# Patient Record
Sex: MALE | Race: BLACK OR AFRICAN AMERICAN | NOT HISPANIC OR LATINO | Employment: OTHER | URBAN - METROPOLITAN AREA
[De-identification: names, ages, dates, MRNs, and addresses within clinical notes are randomized per-mention and may not be internally consistent; named-entity substitution may affect disease eponyms.]

---

## 2017-03-07 PROBLEM — S76.112A RUPTURE OF LEFT QUADRICEPS TENDON: Status: ACTIVE | Noted: 2017-03-07

## 2017-04-04 PROBLEM — L03.90 CELLULITIS: Status: ACTIVE | Noted: 2017-04-04

## 2017-04-04 PROBLEM — L02.01 CELLULITIS AND ABSCESS OF FACE: Status: ACTIVE | Noted: 2017-04-04

## 2017-04-04 PROBLEM — L03.211 CELLULITIS AND ABSCESS OF FACE: Status: ACTIVE | Noted: 2017-04-04

## 2017-04-04 PROBLEM — I50.9 CONGESTIVE HEART FAILURE: Status: ACTIVE | Noted: 2017-04-04

## 2017-04-06 PROBLEM — G47.30 SLEEP APNEA: Chronic | Status: ACTIVE | Noted: 2017-04-06

## 2017-04-07 PROBLEM — T78.40XA ALLERGY: Status: ACTIVE | Noted: 2017-04-07

## 2017-04-12 PROBLEM — G47.30 SLEEP APNEA: Chronic | Status: RESOLVED | Noted: 2017-04-06 | Resolved: 2017-04-12

## 2017-04-12 PROBLEM — L03.211 CELLULITIS AND ABSCESS OF FACE: Status: RESOLVED | Noted: 2017-04-04 | Resolved: 2017-04-12

## 2017-04-12 PROBLEM — L02.01 CELLULITIS AND ABSCESS OF FACE: Status: RESOLVED | Noted: 2017-04-04 | Resolved: 2017-04-12

## 2017-04-12 PROBLEM — L03.90 CELLULITIS: Status: RESOLVED | Noted: 2017-04-04 | Resolved: 2017-04-12

## 2018-01-05 PROBLEM — I48.91 NEW ONSET ATRIAL FIBRILLATION: Status: ACTIVE | Noted: 2018-01-05

## 2018-01-06 PROBLEM — I47.10 SVT (SUPRAVENTRICULAR TACHYCARDIA): Status: ACTIVE | Noted: 2018-01-05

## 2018-12-11 PROBLEM — S42.91XA CLOSED FRACTURE OF RIGHT SHOULDER: Status: ACTIVE | Noted: 2018-12-11

## 2018-12-12 ENCOUNTER — TELEPHONE (OUTPATIENT)
Dept: ORTHOPEDICS | Facility: CLINIC | Age: 53
End: 2018-12-12

## 2018-12-12 PROBLEM — R52 PAIN: Status: ACTIVE | Noted: 2018-12-12

## 2019-04-17 PROBLEM — I26.99 PULMONARY EMBOLUS: Status: ACTIVE | Noted: 2019-04-17

## 2019-04-18 PROBLEM — G95.0 SYRINX OF SPINAL CORD: Status: ACTIVE | Noted: 2019-04-18

## 2019-07-09 PROBLEM — M54.16 LUMBAR RADICULOPATHY: Status: ACTIVE | Noted: 2019-07-09

## 2019-07-17 ENCOUNTER — OFFICE VISIT (OUTPATIENT)
Dept: NEUROSURGERY | Facility: CLINIC | Age: 54
End: 2019-07-17
Payer: MEDICARE

## 2019-07-17 VITALS
BODY MASS INDEX: 44.1 KG/M2 | HEIGHT: 71 IN | DIASTOLIC BLOOD PRESSURE: 75 MMHG | WEIGHT: 315 LBS | TEMPERATURE: 99 F | SYSTOLIC BLOOD PRESSURE: 148 MMHG | HEART RATE: 93 BPM

## 2019-07-17 DIAGNOSIS — G89.29 CHRONIC RIGHT SHOULDER PAIN: ICD-10-CM

## 2019-07-17 DIAGNOSIS — M25.511 CHRONIC RIGHT SHOULDER PAIN: ICD-10-CM

## 2019-07-17 DIAGNOSIS — G95.0 SYRINX OF SPINAL CORD: Primary | ICD-10-CM

## 2019-07-17 DIAGNOSIS — Q76.49 CONGENITAL ANOMALY OF CERVICAL SPINE: ICD-10-CM

## 2019-07-17 PROCEDURE — 99204 PR OFFICE/OUTPT VISIT, NEW, LEVL IV, 45-59 MIN: ICD-10-PCS | Mod: S$PBB,,, | Performed by: NEUROLOGICAL SURGERY

## 2019-07-17 PROCEDURE — 99214 OFFICE O/P EST MOD 30 MIN: CPT | Mod: PBBFAC | Performed by: NEUROLOGICAL SURGERY

## 2019-07-17 PROCEDURE — 99204 OFFICE O/P NEW MOD 45 MIN: CPT | Mod: S$PBB,,, | Performed by: NEUROLOGICAL SURGERY

## 2019-07-17 PROCEDURE — 99999 PR PBB SHADOW E&M-EST. PATIENT-LVL IV: ICD-10-PCS | Mod: PBBFAC,,, | Performed by: NEUROLOGICAL SURGERY

## 2019-07-17 PROCEDURE — 99999 PR PBB SHADOW E&M-EST. PATIENT-LVL IV: CPT | Mod: PBBFAC,,, | Performed by: NEUROLOGICAL SURGERY

## 2019-07-17 RX ORDER — METHYLPREDNISOLONE 4 MG/1
TABLET ORAL
Qty: 1 PACKAGE | Refills: 0 | Status: SHIPPED | OUTPATIENT
Start: 2019-07-17 | End: 2021-03-03 | Stop reason: ALTCHOICE

## 2019-07-17 RX ORDER — AMLODIPINE BESYLATE 10 MG/1
10 TABLET ORAL DAILY
Refills: 5 | Status: ON HOLD | COMMUNITY
Start: 2019-06-26 | End: 2021-04-20 | Stop reason: SDUPTHER

## 2019-07-17 RX ORDER — CYCLOBENZAPRINE HCL 10 MG
10 TABLET ORAL 3 TIMES DAILY PRN
Qty: 60 TABLET | Refills: 0 | Status: SHIPPED | OUTPATIENT
Start: 2019-07-17 | End: 2019-08-16

## 2019-07-17 RX ORDER — NAPROXEN 500 MG/1
500 TABLET ORAL 2 TIMES DAILY WITH MEALS
Qty: 90 TABLET | Refills: 2 | Status: SHIPPED | OUTPATIENT
Start: 2019-07-17 | End: 2019-08-05

## 2019-07-17 RX ORDER — GABAPENTIN 300 MG/1
300 CAPSULE ORAL 3 TIMES DAILY
Qty: 90 CAPSULE | Refills: 0 | Status: SHIPPED | OUTPATIENT
Start: 2019-07-17 | End: 2021-03-03

## 2019-07-17 NOTE — LETTER
July 17, 2019      Mehrdad Lynn MD  [address] [facility] [address]  [address]  [place] [address]           [facility] - Neurosurgery 7th Fl  [address]  [facility] [address]  Phone: [phone]          Patient: Fran Mcdaniel   MR Number: [id number]   YOB: 1965   Date of Visit: 7/17/2019       Dear Dr. Mehrdad Lynn:    Thank you for referring Fran Mcdaniel to me for evaluation. Attached you will find relevant portions of my assessment and plan of care.    If you have questions, please do not hesitate to call me. I look forward to following Fran Mcdaniel along with you.    Sincerely,    Guillermo Magaña MD    Enclosure  CC:  No Recipients    If you would like to receive this communication electronically, please contact [email or web address] or [phone] to request more information on [employer] access.    For providers and/or their staff who would like to refer a patient to [facility], please contact us through our one-stop-shop provider referral line, [facility] , at [phone].    If you feel you have received this communication in error or would no longer like to receive these types of communications, please e-mail [email or web address]

## 2019-07-17 NOTE — PROGRESS NOTES
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  Patient is morbidly obese with multiple medical issues who states that he has known about cervical syrinx since early in life but has not been followed for while.  Over the last year he complained of significant right shoulder and hip pain.  By history he has been worked upper with Orthopedics and he tells me he has been scheduled several times for surgery and then canceled due to complexity of his shoulder issues.  He continues to complain of right arm weakness with significant pain upon range of motion the is really protected with the right arm.  He denies any left arm symptoms he sits has some neck pain but is not severe.  He has he has some walking difficulty with as really due to some right hip pain.  He denies any bowel bladder issues.  Denies any previous history of neck trauma.    His past medical, surgical, social, and family history is as per [facility].  I reviewed his medication in his allergies.    I reviewed MRI scan of the cervical spine done in February 2019.  A 40 not of great quality scan a lot of motion artifact but does not to see these got a cervical thoracic syrinx that extends down most of the cervical spine into the upper thoracic.  His cortisol hole looks atrophied.  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  There is no contrast given.  He has got multiple level cervical disc disease but cervical to see how compressive the are due to some of the motion artifact.  I also reviewed MRI scan of the shoulder which has extensive degenerative changes as well as synovial I disc and swelling and edema around the joint.  Since of non orthopedic surgeon please refer to the radiology report for details of that MRI scan.    On exam he is in a wheelchair has a difficult time getting up from there but he does not appear to be myelopathic.  A can of assess the strength in his right  arm very much because he is very protective in tentative with any kind range of motion and protecting his shoulder.  His hand  appears to be okay left arm is full strength.  He has no Cheney's no clonus.  His sensation is difficult to assess.    Overall having is a complicated patient with a cervical thoracic syrinx of uncertain etiology.  It looks like it has been there for a long time.  I do not think it has anything to do with his shoulder pathology and I think the arm weakness and pain is related to the shoulder rather than the cervical syrinx.  But I think we do need to get MRI scan with contrast just to make sure it would not miss any enhancing lesion and with a CSF flow study.  I think he needs another opinion from Orthopedics about his shoulders and hips and will make the referral.  He has lot of pain a issues I will try a Medrol Dosepak, Neurontin and muscle relaxants.

## 2019-08-06 ENCOUNTER — TELEPHONE (OUTPATIENT)
Dept: SPORTS MEDICINE | Facility: CLINIC | Age: 54
End: 2019-08-06

## 2019-08-06 NOTE — TELEPHONE ENCOUNTER
[facility] for patient requesting a call back to schedule an appt with our practice.  ----- Message from Levar Ugalde DO sent at 2019  8:18 PM CDT -----  The best that I'd be able to do for this would be inject the shoulder and hip. If he hasn't had those, then I'd be more than happy to take a look and possibly go from there. If he doesn't do much better with those, then I'll likely send this on to the surgeons.    DGL      ----- Message -----  From: Tram Davies MA  Sent: 2019   3:44 PM  To: Shimon Siegel MA, Shasta Lacy, #    Hey Dr. Ugalde and Clarisa,    Would this be appropriate for you to see? Dr. Churchill tries to limit his Medicare pts. We can ultimately see him if you feel that would be best as well. Just didn't know the best route to go.    Thank you,  Tram Davies  Sports Medicine Assistant  [facility]    ----- Message -----  From: Zoya Tomlin RN  Sent: 2019   3:30 PM  To: Shimon Siegel MA, Tram Davies MA    Please Yes! I am just trying to find someone to see this benito and have been sent all over. Yes   ----- Message -----  From: Shimon Siegel MA  Sent: 2019   2:27 PM  To: Zoya Tomlin RN, #    Zoya, I've included  Tram on the correspondence. We may offer that the patient be seen by Dr. Levar Ugalde.  ----- Message -----  From: Zoya Tomlin RN  Sent: 2019   1:47 PM  To: Shimon Siegel MA    Ok whenever you can get him in is fine  ----- Message -----  From: Shimon Siegel MA  Sent: 2019  12:41 PM  To: Zoya Tomlin RN, #    Zoya, Dr. Churchill is out of clinic this week and next week.     Shimon      ----- Message -----  From: Zoya Tomlin RN  Sent: 2019  11:55 AM  To: Zhao GREENE Staff    Per DR Magaña can we get this patient an appt , 2nd opinion for shoulder and hip pain, has imaging  ----- Message -----  From: Renata Bolton  Sent: 2019   9:16 AM  To: Zoya BYRD  ORALIA Tomlin Dr.    ----- Message -----  From: Zoya Tomlin RN  Sent: 7/30/2019   3:43 PM  To: Renata Bolton    Do you have any idea who in sports medicine sees both shoulder and hips?  ----- Message -----  From: Renata Bolton  Sent: 7/30/2019   3:06 PM  To: ORALIA Guerrero Dr. does not see hips & he is out of the office this week.   Sorry!  Renata Bolton MA, [facility]  Sports Medicine Assistant to Dr. Coleman      ----- Message -----  From: Zoya Tomlin RN  Sent: 7/30/2019  11:45 AM  To: Jared Nicholson Staff    Per DR Magaña can we get this patient an appt , 2nd opinion for shoulder and hip pain, has imaging  ----- Message -----  From: Dafne Ordaz MA  Sent: 7/30/2019  11:38 AM  To: Zoya Tomlin RN    No .  Sorry   Marina    ----- Message -----  From: Zoya Tomlin RN  Sent: 7/30/2019  11:32 AM  To: Dafne Ordaz MA    Do you know Who that would be?  ----- Message -----  From: Dafne Ordaz MA  Sent: 7/30/2019  10:54 AM  To: Zoya Tomlin RN    This pt may be better served by seeing a provider at sports that can treat both his shoulder and hip pain  Marina   ----- Message -----  From: Zoya Tomlin RN  Sent: 7/18/2019   9:45 AM  To: Terri TORRES Staff    Per DR Magaña can we get this patient an appt , 2nd opinion for shoulder and hip pain, has imaging

## 2019-08-15 ENCOUNTER — TELEPHONE (OUTPATIENT)
Dept: SPORTS MEDICINE | Facility: CLINIC | Age: 54
End: 2019-08-15

## 2019-08-15 NOTE — TELEPHONE ENCOUNTER
LVM for patient explaining that a referral has been placed to [facility]. Explained patient should contact them to schedule.

## 2019-10-16 PROBLEM — M14.611: Status: ACTIVE | Noted: 2019-10-16

## 2019-10-23 ENCOUNTER — TELEPHONE (OUTPATIENT)
Dept: NEUROSURGERY | Facility: CLINIC | Age: 54
End: 2019-10-23

## 2020-02-18 ENCOUNTER — TELEPHONE (OUTPATIENT)
Dept: NEUROSURGERY | Facility: CLINIC | Age: 55
End: 2020-02-18

## 2020-02-18 NOTE — TELEPHONE ENCOUNTER
----- Message from Gia Cheema sent at 2/18/2020  7:53 AM CST -----  Contact: pt  Pt would like to be called back regarding rescheduling appt     Pt can be reached at [phone]

## 2020-02-18 NOTE — TELEPHONE ENCOUNTER
Spoke with patient he states pepito is unable to bring him to his appointment this morning and needs to reschedule. Inform patient that I will reschedule appointment to next available which will not be until July. Patient verbalized understanding.

## 2020-07-13 ENCOUNTER — TELEPHONE (OUTPATIENT)
Dept: NEUROSURGERY | Facility: CLINIC | Age: 55
End: 2020-07-13

## 2020-07-13 DIAGNOSIS — G95.0 SYRINX OF SPINAL CORD: Primary | ICD-10-CM

## 2020-07-13 NOTE — TELEPHONE ENCOUNTER
Attempted to reach pt by phone 2 times today after getting word from [facility] that they could not complete MRI with CSF flow.  Attempted to get done at [facility] and they are unable as well.  Pt was upset and wants a call back later to reschedule.

## 2020-07-13 NOTE — TELEPHONE ENCOUNTER
----- Message from Ayesha Hanley sent at 7/13/2020  1:21 PM CDT -----  Regarding: MRI  Contact: self  Pt states that the MRI that Dr Magaña cannot be done at [facility] need to be schedule at [facility] Pt ask for a call    Contact info  [phone] (home)

## 2020-07-13 NOTE — TELEPHONE ENCOUNTER
----- Message from Misti Bess sent at 7/13/2020  9:22 AM CDT -----  Contact: Billy with [phone]  Nurse called in from [facility] states the flow study that was ordered for pt is  nor performed at that facility. Please call back

## 2020-07-13 NOTE — TELEPHONE ENCOUNTER
Attempted to get pt rescheduled at [facility] and they also do not preform the MRI with CSF Flow.  Attempted to reach the pt to reschedule and left a voice mail.

## 2020-08-11 ENCOUNTER — HOSPITAL ENCOUNTER (OUTPATIENT)
Dept: RADIOLOGY | Facility: HOSPITAL | Age: 55
Discharge: HOME | End: 2020-08-11
Attending: NEUROLOGICAL SURGERY
Payer: MEDICARE

## 2020-08-11 DIAGNOSIS — G95.0 SYRINX OF SPINAL CORD: ICD-10-CM

## 2020-08-11 PROCEDURE — 70551 MRI BRAIN STEM W/O DYE: CPT | Mod: TC

## 2020-08-11 PROCEDURE — 72156 MRI NECK SPINE W/O & W/DYE: CPT | Mod: TC

## 2020-08-11 PROCEDURE — 70551 MRI BRAIN STEM W/O DYE: CPT | Mod: 26,,, | Performed by: RADIOLOGY

## 2020-08-11 PROCEDURE — 70551 MRI CSF FLOW: ICD-10-PCS | Mod: 26,,, | Performed by: RADIOLOGY

## 2020-08-11 PROCEDURE — 72156 MRI NECK SPINE W/O & W/DYE: CPT | Mod: 26,,, | Performed by: RADIOLOGY

## 2020-08-11 PROCEDURE — 72156 MRI CERVICAL SPINE W WO CONTRAST: ICD-10-PCS | Mod: 26,,, | Performed by: RADIOLOGY

## 2020-08-11 PROCEDURE — 25500020 PHARM REV CODE 255: Performed by: NEUROLOGICAL SURGERY

## 2020-08-11 PROCEDURE — A9585 GADOBUTROL INJECTION: HCPCS | Performed by: NEUROLOGICAL SURGERY

## 2020-08-11 RX ORDER — GADOBUTROL 604.72 MG/ML
10 INJECTION INTRAVENOUS
Status: COMPLETED | OUTPATIENT
Start: 2020-08-11 | End: 2020-08-11

## 2020-08-11 RX ADMIN — GADOBUTROL 10 ML: 604.72 INJECTION INTRAVENOUS at 02:08

## 2021-04-20 PROBLEM — I20.89 ANGINAL EQUIVALENT: Status: ACTIVE | Noted: 2021-04-20

## 2022-03-13 DIAGNOSIS — M25.561 CHRONIC PAIN OF BOTH KNEES: Primary | ICD-10-CM

## 2022-03-13 DIAGNOSIS — G89.29 CHRONIC PAIN OF BOTH KNEES: Primary | ICD-10-CM

## 2022-03-13 DIAGNOSIS — M25.562 CHRONIC PAIN OF BOTH KNEES: Primary | ICD-10-CM

## 2023-02-01 NOTE — TELEPHONE ENCOUNTER
I left a voicemail for the patient to discuss the previous treatment. If he has not tired injection therapies Dr. Levar Ugalde can see the patient. If he has tried and failed injection therapies Dr. Mildred Churchill can see the patient. Requested that he call back.   Patient presented after waiting 30 minutes with no reaction to allergy injections. Discharged from clinic.    Javier Shields RN ............   2/1/2023...9:12 AM

## 2024-12-26 ENCOUNTER — TELEPHONE (OUTPATIENT)
Dept: PAIN MEDICINE | Facility: CLINIC | Age: 59
End: 2024-12-26
Payer: MEDICARE

## 2024-12-26 NOTE — TELEPHONE ENCOUNTER
Called patient and LVM regarding his referral for pain management to call back to schedule an appointment.

## 2025-01-08 ENCOUNTER — TELEPHONE (OUTPATIENT)
Dept: PAIN MEDICINE | Facility: CLINIC | Age: 60
End: 2025-01-08
Payer: MEDICARE